# Patient Record
Sex: FEMALE | Race: WHITE | NOT HISPANIC OR LATINO
[De-identification: names, ages, dates, MRNs, and addresses within clinical notes are randomized per-mention and may not be internally consistent; named-entity substitution may affect disease eponyms.]

---

## 2023-01-17 PROBLEM — Z00.00 ENCOUNTER FOR PREVENTIVE HEALTH EXAMINATION: Status: ACTIVE | Noted: 2023-01-17

## 2023-01-18 ENCOUNTER — TRANSCRIPTION ENCOUNTER (OUTPATIENT)
Age: 28
End: 2023-01-18

## 2023-01-18 ENCOUNTER — INPATIENT (INPATIENT)
Facility: HOSPITAL | Age: 28
LOS: 1 days | Discharge: HOME | DRG: 481 | End: 2023-01-20
Attending: ORTHOPAEDIC SURGERY | Admitting: ORTHOPAEDIC SURGERY
Payer: COMMERCIAL

## 2023-01-18 ENCOUNTER — APPOINTMENT (OUTPATIENT)
Dept: ORTHOPEDIC SURGERY | Facility: CLINIC | Age: 28
End: 2023-01-18
Payer: COMMERCIAL

## 2023-01-18 VITALS — WEIGHT: 140 LBS | HEIGHT: 68 IN | BODY MASS INDEX: 21.22 KG/M2

## 2023-01-18 VITALS
TEMPERATURE: 98 F | OXYGEN SATURATION: 99 % | DIASTOLIC BLOOD PRESSURE: 78 MMHG | HEART RATE: 60 BPM | RESPIRATION RATE: 16 BRPM | SYSTOLIC BLOOD PRESSURE: 115 MMHG | WEIGHT: 139.99 LBS | HEIGHT: 68 IN

## 2023-01-18 DIAGNOSIS — S72.002A FRACTURE OF UNSPECIFIED PART OF NECK OF LEFT FEMUR, INITIAL ENCOUNTER FOR CLOSED FRACTURE: ICD-10-CM

## 2023-01-18 DIAGNOSIS — Z82.61 FAMILY HISTORY OF ARTHRITIS: ICD-10-CM

## 2023-01-18 DIAGNOSIS — M08.90 JUVENILE ARTHRITIS, UNSPECIFIED, UNSPECIFIED SITE: ICD-10-CM

## 2023-01-18 LAB
ALBUMIN SERPL ELPH-MCNC: 4.4 G/DL — SIGNIFICANT CHANGE UP (ref 3.3–5)
ALP SERPL-CCNC: 85 U/L — SIGNIFICANT CHANGE UP (ref 40–120)
ALT FLD-CCNC: 12 U/L — SIGNIFICANT CHANGE UP (ref 10–45)
ANION GAP SERPL CALC-SCNC: 10 MMOL/L — SIGNIFICANT CHANGE UP (ref 5–17)
APTT BLD: 33.9 SEC — SIGNIFICANT CHANGE UP (ref 27.5–35.5)
AST SERPL-CCNC: 22 U/L — SIGNIFICANT CHANGE UP (ref 10–40)
BILIRUB SERPL-MCNC: 0.5 MG/DL — SIGNIFICANT CHANGE UP (ref 0.2–1.2)
BLD GP AB SCN SERPL QL: NEGATIVE — SIGNIFICANT CHANGE UP
BUN SERPL-MCNC: 9 MG/DL — SIGNIFICANT CHANGE UP (ref 7–23)
CALCIUM SERPL-MCNC: 9.2 MG/DL — SIGNIFICANT CHANGE UP (ref 8.4–10.5)
CHLORIDE SERPL-SCNC: 103 MMOL/L — SIGNIFICANT CHANGE UP (ref 96–108)
CO2 SERPL-SCNC: 27 MMOL/L — SIGNIFICANT CHANGE UP (ref 22–31)
CREAT SERPL-MCNC: 0.57 MG/DL — SIGNIFICANT CHANGE UP (ref 0.5–1.3)
EGFR: 127 ML/MIN/1.73M2 — SIGNIFICANT CHANGE UP
GLUCOSE SERPL-MCNC: 104 MG/DL — HIGH (ref 70–99)
HCG SERPL-ACNC: <0 MIU/ML — SIGNIFICANT CHANGE UP
HCT VFR BLD CALC: 38.4 % — SIGNIFICANT CHANGE UP (ref 34.5–45)
HGB BLD-MCNC: 12.6 G/DL — SIGNIFICANT CHANGE UP (ref 11.5–15.5)
INR BLD: 1.02 — SIGNIFICANT CHANGE UP (ref 0.88–1.16)
MCHC RBC-ENTMCNC: 29 PG — SIGNIFICANT CHANGE UP (ref 27–34)
MCHC RBC-ENTMCNC: 32.8 GM/DL — SIGNIFICANT CHANGE UP (ref 32–36)
MCV RBC AUTO: 88.5 FL — SIGNIFICANT CHANGE UP (ref 80–100)
NRBC # BLD: 0 /100 WBCS — SIGNIFICANT CHANGE UP (ref 0–0)
PLATELET # BLD AUTO: 392 K/UL — SIGNIFICANT CHANGE UP (ref 150–400)
POTASSIUM SERPL-MCNC: 4.1 MMOL/L — SIGNIFICANT CHANGE UP (ref 3.5–5.3)
POTASSIUM SERPL-SCNC: 4.1 MMOL/L — SIGNIFICANT CHANGE UP (ref 3.5–5.3)
PROT SERPL-MCNC: 7.5 G/DL — SIGNIFICANT CHANGE UP (ref 6–8.3)
PROTHROM AB SERPL-ACNC: 12.2 SEC — SIGNIFICANT CHANGE UP (ref 10.5–13.4)
RBC # BLD: 4.34 M/UL — SIGNIFICANT CHANGE UP (ref 3.8–5.2)
RBC # FLD: 13.9 % — SIGNIFICANT CHANGE UP (ref 10.3–14.5)
RH IG SCN BLD-IMP: POSITIVE — SIGNIFICANT CHANGE UP
RH IG SCN BLD-IMP: POSITIVE — SIGNIFICANT CHANGE UP
SARS-COV-2 RNA SPEC QL NAA+PROBE: NEGATIVE — SIGNIFICANT CHANGE UP
SODIUM SERPL-SCNC: 140 MMOL/L — SIGNIFICANT CHANGE UP (ref 135–145)
WBC # BLD: 4.66 K/UL — SIGNIFICANT CHANGE UP (ref 3.8–10.5)
WBC # FLD AUTO: 4.66 K/UL — SIGNIFICANT CHANGE UP (ref 3.8–10.5)

## 2023-01-18 PROCEDURE — 99205 OFFICE O/P NEW HI 60 MIN: CPT | Mod: NC

## 2023-01-18 PROCEDURE — 99285 EMERGENCY DEPT VISIT HI MDM: CPT

## 2023-01-18 PROCEDURE — 73502 X-RAY EXAM HIP UNI 2-3 VIEWS: CPT | Mod: LT

## 2023-01-18 PROCEDURE — 71045 X-RAY EXAM CHEST 1 VIEW: CPT | Mod: 26

## 2023-01-18 PROCEDURE — 99222 1ST HOSP IP/OBS MODERATE 55: CPT | Mod: 25,57

## 2023-01-18 PROCEDURE — 99222 1ST HOSP IP/OBS MODERATE 55: CPT

## 2023-01-18 RX ORDER — POVIDONE-IODINE 5 %
1 AEROSOL (ML) TOPICAL ONCE
Refills: 0 | Status: COMPLETED | OUTPATIENT
Start: 2023-01-19 | End: 2023-01-19

## 2023-01-18 RX ORDER — ACETAMINOPHEN 500 MG
1000 TABLET ORAL EVERY 8 HOURS
Refills: 0 | Status: DISCONTINUED | OUTPATIENT
Start: 2023-01-18 | End: 2023-01-20

## 2023-01-18 RX ORDER — POLYETHYLENE GLYCOL 3350 17 G/17G
17 POWDER, FOR SOLUTION ORAL DAILY
Refills: 0 | Status: DISCONTINUED | OUTPATIENT
Start: 2023-01-18 | End: 2023-01-20

## 2023-01-18 RX ORDER — PANTOPRAZOLE SODIUM 20 MG/1
40 TABLET, DELAYED RELEASE ORAL
Refills: 0 | Status: DISCONTINUED | OUTPATIENT
Start: 2023-01-18 | End: 2023-01-20

## 2023-01-18 RX ORDER — ONDANSETRON 8 MG/1
4 TABLET, FILM COATED ORAL EVERY 8 HOURS
Refills: 0 | Status: DISCONTINUED | OUTPATIENT
Start: 2023-01-18 | End: 2023-01-20

## 2023-01-18 RX ORDER — OXYCODONE HYDROCHLORIDE 5 MG/1
5 TABLET ORAL EVERY 4 HOURS
Refills: 0 | Status: DISCONTINUED | OUTPATIENT
Start: 2023-01-18 | End: 2023-01-20

## 2023-01-18 RX ORDER — INFLUENZA VIRUS VACCINE 15; 15; 15; 15 UG/.5ML; UG/.5ML; UG/.5ML; UG/.5ML
0.5 SUSPENSION INTRAMUSCULAR ONCE
Refills: 0 | Status: DISCONTINUED | OUTPATIENT
Start: 2023-01-18 | End: 2023-01-20

## 2023-01-18 RX ORDER — SODIUM CHLORIDE 9 MG/ML
1000 INJECTION, SOLUTION INTRAVENOUS
Refills: 0 | Status: DISCONTINUED | OUTPATIENT
Start: 2023-01-18 | End: 2023-01-20

## 2023-01-18 RX ORDER — ACETAMINOPHEN 500 MG
1000 TABLET ORAL EVERY 6 HOURS
Refills: 0 | Status: DISCONTINUED | OUTPATIENT
Start: 2023-01-18 | End: 2023-01-18

## 2023-01-18 RX ORDER — CHLORHEXIDINE GLUCONATE 213 G/1000ML
1 SOLUTION TOPICAL ONCE
Refills: 0 | Status: COMPLETED | OUTPATIENT
Start: 2023-01-19 | End: 2023-01-19

## 2023-01-18 RX ORDER — OXYCODONE HYDROCHLORIDE 5 MG/1
10 TABLET ORAL EVERY 4 HOURS
Refills: 0 | Status: DISCONTINUED | OUTPATIENT
Start: 2023-01-18 | End: 2023-01-20

## 2023-01-18 NOTE — ED ADULT NURSE NOTE - CHIEF COMPLAINT QUOTE
Pt presents to the ED c/o left sided hip pain that began 1 week ago w/o injury. Pt states, "I am a  and I run so I am on my feet a lot" Pt sent to ED by Dr. Howard for possible admission for surgery. Denies injury or any other sx.

## 2023-01-18 NOTE — ED ADULT NURSE NOTE - OBJECTIVE STATEMENT
The patient is a 27y Female complaining of hip pain/injury. Pt reports L hip pain x 1 week, got Xray at urgent care and diagnosed with L hip stress fracture. Pt seen by surgeon today and sent to ED to get admitted for surgery. Pt arrives with crutches, states was told to not bear any weight on affected extremity. Pt denies CP, SOB, F/C, N/V/D.

## 2023-01-18 NOTE — H&P ADULT - PROBLEM SELECTOR PLAN 1
Admit to orthopedic service.  Preop for OR tomorrow for left hip DHS.   Medicine consulted for medical pre-optimization for OR.  Preop labs/EKG pending.  NPO/IVF at midnight for OR tomorrow.   Nutrition consult - per Dr. Howard has hx of disordered eating in the past.

## 2023-01-18 NOTE — ED PROVIDER NOTE - OBJECTIVE STATEMENT
28yo female with pmhx of juvenile arthritis (no longer on medication) presents with 1wk of L hip pain. Pt states she was walking one week ago when she felt a shooting pain in her left hip, denies fall or other trauma. Evaluated at outside  where she had an xray and was referred to a specialist, unable to have appt until today. She reports persistent pain in the left hip, worse with weight bearing. She is a runner but has not been running with this injury. Evaluated today in Dr. Howard where she had xray showing left hip fracture. She is being admitted for operative intervention.

## 2023-01-18 NOTE — ED PROVIDER NOTE - CLINICAL SUMMARY MEDICAL DECISION MAKING FREE TEXT BOX
Pt afebrile and hemodynamically stable. Found to have underlying left hip fracture on xray in Dr. Howard's office. To be admitted to Orthopedics for operative intervention. Ordered for preoperative labs. Admitted to Dr. Howard.

## 2023-01-18 NOTE — ED PROVIDER NOTE - PHYSICAL EXAMINATION
VITAL SIGNS: I have reviewed nursing notes and confirm.  CONSTITUTIONAL: Well-developed; in no acute distress.   SKIN:  warm and dry, no acute rash.   HEAD:  normocephalic, atraumatic.  EYES: PERRL, EOM intact; conjunctiva and sclera clear.  ENT: No nasal discharge; airway clear.   NECK: Supple; non tender.  CARD: S1, S2 normal; no murmurs, gallops, or rubs. Regular rate and rhythm.   RESP:  Clear to auscultation b/l, no wheezes, rales or rhonchi.  ABD: Normal bowel sounds; soft; non-distended; non-tender; no guarding/ rebound.  EXT: See orthopedic exam.  NEURO: Alert, oriented, grossly unremarkable  PSYCH: Cooperative, mood and affect appropriate.

## 2023-01-18 NOTE — ED PROVIDER NOTE - NS ED ATTENDING STATEMENT MOD
This was a shared visit with the LESTER. I reviewed and verified the documentation and independently performed the documented:

## 2023-01-18 NOTE — H&P ADULT - NSHPPHYSICALEXAM_GEN_ALL_CORE
Constitutional: vitals reviewed per nursing documentation, NAD, lying comfortably in bed/stretcher  Eyes: no obvious deformity, ecchymoses, swelling of eyelids  Neck: trachea midline  Lungs: not using accessory muscles of respiration, normal respiratory effort  Neuro/Psych: A&Ox3, normal affect and mood  MSK:   Skin warm and well perfused. Bilateral hips/knees non-tender to palpation. No open wounds/lesions to bilateral lower extremities. DP pulse palpable left lower extremity. Sensation intact and equal bilateral lower extremities. EHL/TA/GS/FHL 5/5 bilateral lower extremities. Range of motion left hip not tested 2/2 known left hip fracture.

## 2023-01-18 NOTE — CONSULT NOTE ADULT - SUBJECTIVE AND OBJECTIVE BOX
HPI:  This is a 26yo woman with a PMH of Juvenile arthritis (now resolved) and well-controlled anorexia nervosa who p/w 2 weeks of left hip, found to have a L-hip fracture.  She notes that 2 weeks ago she notes a pinch in her L-hip that progressed and peaked about 1 week ago, prompting her to go to Urgent Care.  There she was diagnosed with a hip fracture and referred to a surgeon, who reportedly could not perform the necessary procedure.  She then found Dr. Howard's name online and went to his office this AM, who then referred her to the ED this afternoon.  She denies any trauma to her L-hip.  She is an avid runner but has not been running more than usual.  She was on biologics as a child for her JA but never on prolonged steroids.  She has a history of anorexia for which she's been working with therapist and has been making marked improvements over the past year.  On ROS she denies weight changes, hair loss, heat intolerances or skin changes.  She does have noticeable hand dryness which she attributes to working as a  and outside temperatures.  As for her family history, there is a maternal grandmother with OA but no other known bone disease.  Remainder of her 12-point ROS otherwise negative.     PAST MEDICAL & SURGICAL HISTORY:  H/O juvenile arthritis    Home Medications: No meds nor OTC's.     Allergies  No Known Allergies    FAMILY HISTORY:  As per HPI.    Social History:  Works as a .  Uninsured.  Independent w/ADL's. Lives in a walk-up in the [place].   Denies toxic habits x3.     CURRENT MEDICATIONS:   acetaminophen     Tablet .. 1000 milliGRAM(s) Oral every 8 hours  lactated ringers. 1000 milliLiter(s) IV Continuous <Continuous>  ondansetron    Tablet 4 milliGRAM(s) Oral every 8 hours PRN  oxyCODONE    IR 10 milliGRAM(s) Oral every 4 hours PRN  oxyCODONE    IR 5 milliGRAM(s) Oral every 4 hours PRN  pantoprazole    Tablet 40 milliGRAM(s) Oral before breakfast  polyethylene glycol 3350 17 Gram(s) Oral daily      VITAL SIGNS, INS/OUTS (last 24 hours):  Vital Signs Last 24 Hrs  T(C): 36.9 (18 Jan 2023 11:03), Max: 36.9 (18 Jan 2023 11:03)  T(F): 98.4 (18 Jan 2023 11:03), Max: 98.4 (18 Jan 2023 11:03)  HR: 60 (18 Jan 2023 11:03) (60 - 60)  BP: 115/78 (18 Jan 2023 11:03) (115/78 - 115/78)  BP(mean): --  RR: 16 (18 Jan 2023 11:03) (16 - 16)  SpO2: 99% (18 Jan 2023 11:03) (99% - 99%)    Parameters below as of 18 Jan 2023 11:03  Patient On (Oxygen Delivery Method): room air    I&O's Summary    EXAM:  Gen: NAD, sitting upright in wheelchair  HEENT: NCAT, MMM, clear OP  Neck: supple, trachea at midline  CV: RRR, no m/g/r appreciated  Pulm: CTA B, no w/r/r; no increase in WOB  Abd: normoactive BS, soft, NTND  Ext: WWP, 2+ pulses x4; no c/c/e  Skin: bilateral hands w/skin cracking  Neuro: AOx3, CN II-XII grossly intact; nonfocal  Psych: pleasant, conversant and appropriate    BASIC LABS:                        12.6   4.66  )-----------( 392      ( 18 Jan 2023 11:32 )             38.4     01-18    140  |  103  |  9   ----------------------------<  104<H>  4.1   |  27  |  0.57    Ca    9.2      18 Jan 2023 11:32    TPro  7.5  /  Alb  4.4  /  TBili  0.5  /  DBili  x   /  AST  22  /  ALT  12  /  AlkPhos  85  01-18    PT/INR - ( 18 Jan 2023 11:32 )   PT: 12.2 sec;   INR: 1.02     PTT - ( 18 Jan 2023 11:32 )  PTT:33.9 sec    MICRODATA:  -- No new microdata.    IMAGING:  Unable to open PACS to assess if CXR done.     EKG: sinus rhythm w/R-axis deviation and ?iRBBB

## 2023-01-18 NOTE — CONSULT NOTE ADULT - ASSESSMENT
This is a 26yo woman with a PMH of Juvenile arthritis (now resolved) and well-controlled anorexia nervosa who p/w 2 weeks of left hip, found to have a L-hip fracture, now waiting for OR on 1/19.    #Pre-op exam  -- Risk of planned surgical procedure is intermediate.  -- Patient does not have high risk cardiac conditions (i.e. unstable angina, decompensated CHF, significant arrhythmia, significant valvular disease)   -- RCRI score 0, Pimentel score 0   -- Patient meet METs >4 blocks and >2 flights of stairs.  -- Patient is low cardiac risk for intermediate risk procedure. Can proceed w/surgery if accepting of these risks.  -- Would repeat EKG to assess for stability.   -- Would obtain non-urgent BRAULIO if repeat EKG with iRBBB; however, would not hold up surgery for it.      #Anorexia nervosa  -- can check vitamin D, TSH and PTH  -- will need DEXA scan as an outpatient     #Diet - NPO  #DVT PPx - defer to primary team  #Dispo - TBD    Renetta Galeano  Attending Hospitalist  [phone]

## 2023-01-18 NOTE — PATIENT PROFILE ADULT - NSTRANSFERBELONGINGSRESP_GEN_A_NUR
Coronary artery disease is a condition where the arteries the supply the heart muscle get clogges with fatty deposits & puts you at risk for a heart attack  Call your doctor if you have any new pain, pressure, or discomfort in the center of your chest, pain, tingling or discomfort in arms, back, neck, jaw, or stomach, shortness of breath, nausea, vomiting, burping or heartburn, sweating, cold and clammy skin, racing or abnormal heartbeat for more than 10 minutes or if they keep coming & going.  Call 911 and do not tr to get to hospital by care  You can help yourself with lefestyle changes (quitting smoking if you smoke), eat lots of fruits & vegetables & low fat dairy products, not a lot of meat & fatty foods, walk or some form of physical activity most days of the week, lose weight if you are overweight  Take your cardiac medication as prescribed to lower cholesterol, to lower blood pressure, aspirin to prevent blood clots, and diabetes control  Make sure to keep appointments with doctor for cardiac follow up care Low salt diet  Activity as tolerated.  Take all medication as prescribed.  Follow up with your medical doctor for routine blood pressure monitoring at your next visit.  Notify your doctor if you have any of the following symptoms:   Dizziness, Lightheadedness, Blurry vision, Headache, Chest pain, Shortness of breath yes resolved Fainting usually is caused by fear, stress, pain, standing too long, over tired, overheated, going to bathroom, or coughing  Blood pressure can drop if you do not drink enough, blood pressure medication, alcohol, bleeding,  Consult with your doctor about driving  Avoid activity or condition that causes your syncope  Lay down with your feet up when you feel like you might faint Mechanical break done of PCM lead with Pacemaker and lead extraction and   DDD PCM implantation  your pacemaker can send an electrical signal to your heart when it is necessary  call your doctor if you feel dizzy, lightheaded, shortness of breath, confused, more tired, faint  you will follow up with your pacemaker doctor regularly to check your pacemaker  your pacemaker battery usually will last 5 - 8 years  avoid certain electric or magnetic equipment  if you cannot walk through metal detector, ask for hand security search  most of the time you will not be able to have MRI  follow directions  that you received about arm use and mobility, driving, sex & sling use  you make want to get a emergency medical bracelet telling people you have a pacemaker  tell any health care provider that you have a pacemaker PPM care 1. No shower for the next week  2. no heavy lifting at all   3. keep dressing over PPM site for 3 more days or until wound heals   4. apply bacitracin ointment to pacemaker site twice a day and cover with dry sterile dressing    5. no lifting with left arm   6. do not raise left arm over head or reaching for any objects

## 2023-01-18 NOTE — PATIENT PROFILE ADULT - FALL HARM RISK - HARM RISK INTERVENTIONS
Assistance with ambulation/Assistance OOB with selected safe patient handling equipment/Communicate Risk of Fall with Harm to all staff/Discuss with provider need for PT consult/Monitor gait and stability/Reinforce activity limits and safety measures with patient and family/Tailored Fall Risk Interventions/Visual Cue: Yellow wristband and red socks/Bed in lowest position, wheels locked, appropriate side rails in place/Call bell, personal items and telephone in reach/Instruct patient to call for assistance before getting out of bed or chair/Non-slip footwear when patient is out of bed/Agenda to call system/Physically safe environment - no spills, clutter or unnecessary equipment/Purposeful Proactive Rounding/Room/bathroom lighting operational, light cord in reach

## 2023-01-18 NOTE — H&P ADULT - NS ATTEND AMEND GEN_ALL_CORE FT
Patient was referred to the hospital from my office today after identifcationof displaced femoral neck fracture, given age and nature of injury extremely high risk and urgent, especially in the setting of delayed evaluation and treatment after initial urgent care visit.    Woill proceed to medical clearance and surgery.  Strict bed rest.  Neutrition eval and counseling.  pt agrees.

## 2023-01-18 NOTE — ED ADULT NURSE NOTE - NSSUHOSCREENINGYN_ED_ALL_ED
Refill approved as requested.  
Refill request for Effexor XR 75 mg cap  Last refill 5/17/21to [facility]    Last visit was 4/26/21  Future appointment None    Spoke with patient, she does not need a refill today. Patient switched pharmacies. Patient asked if we can sent a prescription to be filled when its due. Rx is post dated until 6/16/21  Rx ready to sign if appropriate to refill.           
See refill order    
Yes - the patient is able to be screened

## 2023-01-18 NOTE — H&P ADULT - HISTORY OF PRESENT ILLNESS
The patient is a 27 year old female with no significant past medical history presenting with left hip pain. The patient states she works as a  and is on her feet often for work and is also a runner. She states that 2 weeks ago she began having mild pain in left groin without preceding accident, injury or trauma. She states that after she began having this pain she tried to rest more and stopped running but that 1 week ago she developed a sharp pain in her left hip to the point she was having trouble walking. She went to an urgent care center and was told that she has a left hip fracture and was referred to a specialist. The patient saw Dr. Howard in his outpatient office today and was sent to the ER for further management. Denies numbness/tingling or pain in any other joints.

## 2023-01-19 ENCOUNTER — TRANSCRIPTION ENCOUNTER (OUTPATIENT)
Age: 28
End: 2023-01-19

## 2023-01-19 PROCEDURE — 27236 TREAT THIGH FRACTURE: CPT | Mod: LT

## 2023-01-19 PROCEDURE — 93306 TTE W/DOPPLER COMPLETE: CPT | Mod: 26

## 2023-01-19 PROCEDURE — 99232 SBSQ HOSP IP/OBS MODERATE 35: CPT

## 2023-01-19 DEVICE — IMPLANTABLE DEVICE: Type: IMPLANTABLE DEVICE | Status: FUNCTIONAL

## 2023-01-19 DEVICE — SCREW P/T ASNS III 6.5X85MM: Type: IMPLANTABLE DEVICE | Status: FUNCTIONAL

## 2023-01-19 DEVICE — GUIDEWIRE OMEGA GUIDE PIN COCR THREADED TIP 2.8MM X 230MM: Type: IMPLANTABLE DEVICE | Status: FUNCTIONAL

## 2023-01-19 DEVICE — GWIRE ASNIS III THREADED 3.2X300MM: Type: IMPLANTABLE DEVICE | Status: FUNCTIONAL

## 2023-01-19 RX ORDER — ONDANSETRON 8 MG/1
4 TABLET, FILM COATED ORAL EVERY 6 HOURS
Refills: 0 | Status: DISCONTINUED | OUTPATIENT
Start: 2023-01-19 | End: 2023-01-20

## 2023-01-19 RX ORDER — POLYETHYLENE GLYCOL 3350 17 G/17G
17 POWDER, FOR SOLUTION ORAL AT BEDTIME
Refills: 0 | Status: DISCONTINUED | OUTPATIENT
Start: 2023-01-19 | End: 2023-01-20

## 2023-01-19 RX ORDER — HYDROMORPHONE HYDROCHLORIDE 2 MG/ML
1 INJECTION INTRAMUSCULAR; INTRAVENOUS; SUBCUTANEOUS
Refills: 0 | Status: DISCONTINUED | OUTPATIENT
Start: 2023-01-19 | End: 2023-01-19

## 2023-01-19 RX ORDER — CYCLOBENZAPRINE HYDROCHLORIDE 10 MG/1
5 TABLET, FILM COATED ORAL THREE TIMES A DAY
Refills: 0 | Status: DISCONTINUED | OUTPATIENT
Start: 2023-01-19 | End: 2023-01-20

## 2023-01-19 RX ORDER — OXYCODONE HYDROCHLORIDE 5 MG/1
10 TABLET ORAL EVERY 4 HOURS
Refills: 0 | Status: DISCONTINUED | OUTPATIENT
Start: 2023-01-19 | End: 2023-01-20

## 2023-01-19 RX ORDER — PANTOPRAZOLE SODIUM 20 MG/1
40 TABLET, DELAYED RELEASE ORAL
Refills: 0 | Status: DISCONTINUED | OUTPATIENT
Start: 2023-01-19 | End: 2023-01-20

## 2023-01-19 RX ORDER — CEFAZOLIN SODIUM 1 G
2000 VIAL (EA) INJECTION EVERY 8 HOURS
Refills: 0 | Status: COMPLETED | OUTPATIENT
Start: 2023-01-19 | End: 2023-01-20

## 2023-01-19 RX ORDER — HYDROMORPHONE HYDROCHLORIDE 2 MG/ML
0.5 INJECTION INTRAMUSCULAR; INTRAVENOUS; SUBCUTANEOUS
Refills: 0 | Status: DISCONTINUED | OUTPATIENT
Start: 2023-01-19 | End: 2023-01-20

## 2023-01-19 RX ORDER — SODIUM CHLORIDE 9 MG/ML
1000 INJECTION, SOLUTION INTRAVENOUS
Refills: 0 | Status: DISCONTINUED | OUTPATIENT
Start: 2023-01-19 | End: 2023-01-20

## 2023-01-19 RX ORDER — MAGNESIUM HYDROXIDE 400 MG/1
30 TABLET, CHEWABLE ORAL DAILY
Refills: 0 | Status: DISCONTINUED | OUTPATIENT
Start: 2023-01-19 | End: 2023-01-20

## 2023-01-19 RX ORDER — SENNA PLUS 8.6 MG/1
2 TABLET ORAL AT BEDTIME
Refills: 0 | Status: DISCONTINUED | OUTPATIENT
Start: 2023-01-19 | End: 2023-01-20

## 2023-01-19 RX ORDER — ACETAMINOPHEN 500 MG
650 TABLET ORAL EVERY 6 HOURS
Refills: 0 | Status: DISCONTINUED | OUTPATIENT
Start: 2023-01-19 | End: 2023-01-20

## 2023-01-19 RX ORDER — ASPIRIN/CALCIUM CARB/MAGNESIUM 324 MG
81 TABLET ORAL
Refills: 0 | Status: DISCONTINUED | OUTPATIENT
Start: 2023-01-20 | End: 2023-01-20

## 2023-01-19 RX ORDER — OXYCODONE HYDROCHLORIDE 5 MG/1
5 TABLET ORAL EVERY 4 HOURS
Refills: 0 | Status: DISCONTINUED | OUTPATIENT
Start: 2023-01-19 | End: 2023-01-20

## 2023-01-19 RX ADMIN — HYDROMORPHONE HYDROCHLORIDE 1 MILLIGRAM(S): 2 INJECTION INTRAMUSCULAR; INTRAVENOUS; SUBCUTANEOUS at 19:24

## 2023-01-19 RX ADMIN — Medication 1 APPLICATION(S): at 13:22

## 2023-01-19 RX ADMIN — Medication 650 MILLIGRAM(S): at 23:50

## 2023-01-19 RX ADMIN — Medication 100 MILLIGRAM(S): at 23:33

## 2023-01-19 RX ADMIN — Medication 650 MILLIGRAM(S): at 22:50

## 2023-01-19 RX ADMIN — CHLORHEXIDINE GLUCONATE 1 APPLICATION(S): 213 SOLUTION TOPICAL at 05:18

## 2023-01-19 RX ADMIN — SODIUM CHLORIDE 65 MILLILITER(S): 9 INJECTION, SOLUTION INTRAVENOUS at 00:09

## 2023-01-19 RX ADMIN — POLYETHYLENE GLYCOL 3350 17 GRAM(S): 17 POWDER, FOR SOLUTION ORAL at 22:50

## 2023-01-19 RX ADMIN — CYCLOBENZAPRINE HYDROCHLORIDE 5 MILLIGRAM(S): 10 TABLET, FILM COATED ORAL at 23:51

## 2023-01-19 RX ADMIN — SENNA PLUS 2 TABLET(S): 8.6 TABLET ORAL at 22:49

## 2023-01-19 NOTE — PROGRESS NOTE ADULT - ASSESSMENT
This is a 28yo woman with a PMH of Juvenile arthritis (now resolved) and well-controlled anorexia nervosa who p/w 2 weeks of left hip, found to have a L-hip fracture, now waiting for OR on 1/19.    #Pre-op exam  -- Risk of planned surgical procedure is intermediate.  -- Patient does not have high risk cardiac conditions (i.e. unstable angina, decompensated CHF, significant arrhythmia, significant valvular disease)   -- RCRI score 0, Pimentel score 0   -- Patient meet METs >4 blocks and >2 flights of stairs.  -- Patient is low cardiac risk for intermediate risk procedure. Can proceed w/surgery if accepting of these risks.  -- Would obtain non-urgent BRAULIO if repeat EKG with iRBBB; however, would not hold up surgery for it.      #Anorexia nervosa  -- can check vitamin D, TSH and PTH  -- will need DEXA scan as an outpatient     #Diet - NPO  #DVT PPx - defer to primary team  #Dispo - TBD; needs to speak to SW about payment plans as uninsured     Renetta Galeano  Attending Hospitalist  [phone]

## 2023-01-19 NOTE — DIETITIAN INITIAL EVALUATION ADULT - PERTINENT LABORATORY DATA
01-18    140  |  103  |  9   ----------------------------<  104<H>  4.1   |  27  |  0.57    Ca    9.2      18 Jan 2023 11:32    TPro  7.5  /  Alb  4.4  /  TBili  0.5  /  DBili  x   /  AST  22  /  ALT  12  /  AlkPhos  85  01-18

## 2023-01-19 NOTE — DIETITIAN INITIAL EVALUATION ADULT - NS FNS DIET ORDER
Diet, NPO after Midnight:      NPO Start Date: 18-Jan-2023,   NPO Start Time: 23:59 (01-18-23 @ 12:50)

## 2023-01-19 NOTE — BRIEF OPERATIVE NOTE - NSICDXBRIEFPROCEDURE_GEN_ALL_CORE_FT
PROCEDURES:  Open reduction and internal fixation of fracture of left femoral neck with dynamic hip screw 19-Jan-2023 16:49:21  Ethan Tavarez

## 2023-01-19 NOTE — DIETITIAN INITIAL EVALUATION ADULT - PERTINENT MEDS FT
MEDICATIONS  (STANDING):  acetaminophen     Tablet .. 1000 milliGRAM(s) Oral every 8 hours  influenza   Vaccine 0.5 milliLiter(s) IntraMuscular once  lactated ringers. 1000 milliLiter(s) (65 mL/Hr) IV Continuous <Continuous>  pantoprazole    Tablet 40 milliGRAM(s) Oral before breakfast  polyethylene glycol 3350 17 Gram(s) Oral daily  povidone iodine 5% Nasal Swab 1 Application(s) Both Nostrils once    MEDICATIONS  (PRN):  ondansetron    Tablet 4 milliGRAM(s) Oral every 8 hours PRN Nausea and/or Vomiting  oxyCODONE    IR 10 milliGRAM(s) Oral every 4 hours PRN Severe Pain (7 - 10)  oxyCODONE    IR 5 milliGRAM(s) Oral every 4 hours PRN Moderate Pain (4 - 6)

## 2023-01-19 NOTE — DIETITIAN INITIAL EVALUATION ADULT - ADD RECOMMEND
1. Advance diet to regular when medically able  2. Monitor %PO intake and tolerance  3. Pain and bowel regimens per team  4. RD to remain available prn

## 2023-01-19 NOTE — DIETITIAN INITIAL EVALUATION ADULT - OTHER INFO
27 year old female with no significant past medical history presenting with left hip pain. The patient states she works as a  and is on her feet often for work and is also a runner. She states that 2 weeks ago she began having mild pain in left groin without preceding accident, injury or trauma. She states that after she began having this pain she tried to rest more and stopped running but that 1 week ago she developed a sharp pain in her left hip to the point she was having trouble walking. She went to an urgent care center and was told that she has a left hip fracture and was referred to a specialist. The patient saw Dr. Howard in his outpatient office today and was sent to the ER for further management.    Pt seen in room for nutrition assessment. Currently NPO for OR today. Pt reports good/fair appetite at baseline, currently decreased d/t being in the hospital. Per diet recall: has small meal in the morning, tastes food items at work during lunch (works as a ), has larger meal after work around 8pm. Likes fruits, vegetables, lean protein. NKFA. No cultural, ethnic, Religion food preferences noted. Pt reports stability at current wt ~140lbs. NFPE unremarkable. Pt reports history of disordered eating, has seen therapist and nutritionist in the past and reports "doing a lot better" therefore discouraged about being in the hospital. RD provided emotional support as well education on importance of adequate PO intake. Discussed importance of protein for healing/recovery and calcium/vitamin D for bone health. Pt expressed understanding. Left RD contact information. Pt denies N/V/D/C at present. Denies pain. Please see full nutrition recommendations below. Will continue to follow per RD protocol.

## 2023-01-20 ENCOUNTER — TRANSCRIPTION ENCOUNTER (OUTPATIENT)
Age: 28
End: 2023-01-20

## 2023-01-20 VITALS
OXYGEN SATURATION: 100 % | RESPIRATION RATE: 16 BRPM | SYSTOLIC BLOOD PRESSURE: 97 MMHG | DIASTOLIC BLOOD PRESSURE: 58 MMHG | HEART RATE: 49 BPM | TEMPERATURE: 98 F

## 2023-01-20 LAB
24R-OH-CALCIDIOL SERPL-MCNC: 11 NG/ML — LOW (ref 30–80)
ANION GAP SERPL CALC-SCNC: 14 MMOL/L — SIGNIFICANT CHANGE UP (ref 5–17)
BUN SERPL-MCNC: 11 MG/DL — SIGNIFICANT CHANGE UP (ref 7–23)
CALCIUM SERPL-MCNC: 8.7 MG/DL — SIGNIFICANT CHANGE UP (ref 8.4–10.5)
CALCIUM SERPL-MCNC: 9.5 MG/DL — SIGNIFICANT CHANGE UP (ref 8.4–10.5)
CHLORIDE SERPL-SCNC: 96 MMOL/L — SIGNIFICANT CHANGE UP (ref 96–108)
CO2 SERPL-SCNC: 25 MMOL/L — SIGNIFICANT CHANGE UP (ref 22–31)
CREAT SERPL-MCNC: 0.57 MG/DL — SIGNIFICANT CHANGE UP (ref 0.5–1.3)
EGFR: 127 ML/MIN/1.73M2 — SIGNIFICANT CHANGE UP
GLUCOSE SERPL-MCNC: 102 MG/DL — HIGH (ref 70–99)
HCT VFR BLD CALC: 36.4 % — SIGNIFICANT CHANGE UP (ref 34.5–45)
HGB BLD-MCNC: 11.6 G/DL — SIGNIFICANT CHANGE UP (ref 11.5–15.5)
MAGNESIUM SERPL-MCNC: 1.7 MG/DL — SIGNIFICANT CHANGE UP (ref 1.6–2.6)
MCHC RBC-ENTMCNC: 28.6 PG — SIGNIFICANT CHANGE UP (ref 27–34)
MCHC RBC-ENTMCNC: 31.9 GM/DL — LOW (ref 32–36)
MCV RBC AUTO: 89.9 FL — SIGNIFICANT CHANGE UP (ref 80–100)
NRBC # BLD: 0 /100 WBCS — SIGNIFICANT CHANGE UP (ref 0–0)
PHOSPHATE SERPL-MCNC: 5.1 MG/DL — HIGH (ref 2.5–4.5)
PLATELET # BLD AUTO: 324 K/UL — SIGNIFICANT CHANGE UP (ref 150–400)
POTASSIUM SERPL-MCNC: 4.2 MMOL/L — SIGNIFICANT CHANGE UP (ref 3.5–5.3)
POTASSIUM SERPL-SCNC: 4.2 MMOL/L — SIGNIFICANT CHANGE UP (ref 3.5–5.3)
PREALB SERPL-MCNC: 18 MG/DL — LOW (ref 20–40)
PTH-INTACT FLD-MCNC: 25 PG/ML — SIGNIFICANT CHANGE UP (ref 15–65)
RBC # BLD: 4.05 M/UL — SIGNIFICANT CHANGE UP (ref 3.8–5.2)
RBC # FLD: 13.5 % — SIGNIFICANT CHANGE UP (ref 10.3–14.5)
SODIUM SERPL-SCNC: 135 MMOL/L — SIGNIFICANT CHANGE UP (ref 135–145)
TSH SERPL-MCNC: 0.91 UIU/ML — SIGNIFICANT CHANGE UP (ref 0.27–4.2)
VIT D25+D1,25 OH+D1,25 PNL SERPL-MCNC: 26.3 PG/ML — SIGNIFICANT CHANGE UP (ref 19.9–79.3)
WBC # BLD: 8.9 K/UL — SIGNIFICANT CHANGE UP (ref 3.8–10.5)
WBC # FLD AUTO: 8.9 K/UL — SIGNIFICANT CHANGE UP (ref 3.8–10.5)

## 2023-01-20 PROCEDURE — 84443 ASSAY THYROID STIM HORMONE: CPT

## 2023-01-20 PROCEDURE — 84100 ASSAY OF PHOSPHORUS: CPT

## 2023-01-20 PROCEDURE — 86900 BLOOD TYPING SEROLOGIC ABO: CPT

## 2023-01-20 PROCEDURE — 72170 X-RAY EXAM OF PELVIS: CPT | Mod: 26

## 2023-01-20 PROCEDURE — 85730 THROMBOPLASTIN TIME PARTIAL: CPT

## 2023-01-20 PROCEDURE — 71045 X-RAY EXAM CHEST 1 VIEW: CPT

## 2023-01-20 PROCEDURE — 80048 BASIC METABOLIC PNL TOTAL CA: CPT

## 2023-01-20 PROCEDURE — 86850 RBC ANTIBODY SCREEN: CPT

## 2023-01-20 PROCEDURE — 87635 SARS-COV-2 COVID-19 AMP PRB: CPT

## 2023-01-20 PROCEDURE — 97161 PT EVAL LOW COMPLEX 20 MIN: CPT

## 2023-01-20 PROCEDURE — 93306 TTE W/DOPPLER COMPLETE: CPT

## 2023-01-20 PROCEDURE — 83735 ASSAY OF MAGNESIUM: CPT

## 2023-01-20 PROCEDURE — 93005 ELECTROCARDIOGRAM TRACING: CPT

## 2023-01-20 PROCEDURE — 82652 VIT D 1 25-DIHYDROXY: CPT

## 2023-01-20 PROCEDURE — 85027 COMPLETE CBC AUTOMATED: CPT

## 2023-01-20 PROCEDURE — 80053 COMPREHEN METABOLIC PANEL: CPT

## 2023-01-20 PROCEDURE — 72170 X-RAY EXAM OF PELVIS: CPT

## 2023-01-20 PROCEDURE — 99232 SBSQ HOSP IP/OBS MODERATE 35: CPT

## 2023-01-20 PROCEDURE — 84702 CHORIONIC GONADOTROPIN TEST: CPT

## 2023-01-20 PROCEDURE — C1769: CPT

## 2023-01-20 PROCEDURE — 99285 EMERGENCY DEPT VISIT HI MDM: CPT

## 2023-01-20 PROCEDURE — 84134 ASSAY OF PREALBUMIN: CPT

## 2023-01-20 PROCEDURE — 86901 BLOOD TYPING SEROLOGIC RH(D): CPT

## 2023-01-20 PROCEDURE — 82306 VITAMIN D 25 HYDROXY: CPT

## 2023-01-20 PROCEDURE — C1713: CPT

## 2023-01-20 PROCEDURE — 76000 FLUOROSCOPY <1 HR PHYS/QHP: CPT

## 2023-01-20 PROCEDURE — 36415 COLL VENOUS BLD VENIPUNCTURE: CPT

## 2023-01-20 PROCEDURE — 85610 PROTHROMBIN TIME: CPT

## 2023-01-20 PROCEDURE — 82310 ASSAY OF CALCIUM: CPT

## 2023-01-20 PROCEDURE — 83970 ASSAY OF PARATHORMONE: CPT

## 2023-01-20 RX ORDER — PANTOPRAZOLE SODIUM 20 MG/1
1 TABLET, DELAYED RELEASE ORAL
Qty: 30 | Refills: 0
Start: 2023-01-20 | End: 2023-02-18

## 2023-01-20 RX ORDER — SODIUM CHLORIDE 9 MG/ML
500 INJECTION INTRAMUSCULAR; INTRAVENOUS; SUBCUTANEOUS ONCE
Refills: 0 | Status: COMPLETED | OUTPATIENT
Start: 2023-01-20 | End: 2023-01-20

## 2023-01-20 RX ORDER — OXYCODONE HYDROCHLORIDE 5 MG/1
1 TABLET ORAL
Qty: 42 | Refills: 0
Start: 2023-01-20 | End: 2023-01-26

## 2023-01-20 RX ORDER — SENNA PLUS 8.6 MG/1
2 TABLET ORAL
Qty: 0 | Refills: 0 | DISCHARGE
Start: 2023-01-20

## 2023-01-20 RX ORDER — ERGOCALCIFEROL 1.25 MG/1
50000 CAPSULE ORAL ONCE
Refills: 0 | Status: COMPLETED | OUTPATIENT
Start: 2023-01-20 | End: 2023-01-20

## 2023-01-20 RX ORDER — CYCLOBENZAPRINE HYDROCHLORIDE 10 MG/1
1 TABLET, FILM COATED ORAL
Qty: 42 | Refills: 0
Start: 2023-01-20 | End: 2023-02-02

## 2023-01-20 RX ORDER — ACETAMINOPHEN 500 MG
2 TABLET ORAL
Qty: 0 | Refills: 0 | DISCHARGE
Start: 2023-01-20

## 2023-01-20 RX ORDER — ERGOCALCIFEROL 1.25 MG/1
1 CAPSULE ORAL
Qty: 0 | Refills: 0 | DISCHARGE
Start: 2023-01-20

## 2023-01-20 RX ORDER — ASPIRIN/CALCIUM CARB/MAGNESIUM 324 MG
1 TABLET ORAL
Qty: 60 | Refills: 0
Start: 2023-01-20 | End: 2023-02-18

## 2023-01-20 RX ADMIN — Medication 650 MILLIGRAM(S): at 06:51

## 2023-01-20 RX ADMIN — Medication 650 MILLIGRAM(S): at 05:51

## 2023-01-20 RX ADMIN — Medication 650 MILLIGRAM(S): at 12:44

## 2023-01-20 RX ADMIN — Medication 1 TABLET(S): at 11:45

## 2023-01-20 RX ADMIN — SODIUM CHLORIDE 500 MILLILITER(S): 9 INJECTION INTRAMUSCULAR; INTRAVENOUS; SUBCUTANEOUS at 14:42

## 2023-01-20 RX ADMIN — ERGOCALCIFEROL 50000 UNIT(S): 1.25 CAPSULE ORAL at 15:17

## 2023-01-20 RX ADMIN — CYCLOBENZAPRINE HYDROCHLORIDE 5 MILLIGRAM(S): 10 TABLET, FILM COATED ORAL at 12:05

## 2023-01-20 RX ADMIN — Medication 100 MILLIGRAM(S): at 07:03

## 2023-01-20 RX ADMIN — Medication 650 MILLIGRAM(S): at 11:44

## 2023-01-20 NOTE — PROGRESS NOTE ADULT - ATTENDING COMMENTS
Patient doing well postop; eval by PT, niutrition counseling completed.  Followup in 2 wks outpt once clears PT and discharged.

## 2023-01-20 NOTE — PHYSICAL THERAPY INITIAL EVALUATION ADULT - GENERAL OBSERVATIONS, REHAB EVAL
Patient received semi-sierra in bed  in NAD on RA, +SCDs, +Heplock. Cleared by RACHAEL Sanchez. Agreeable to PT.

## 2023-01-20 NOTE — DISCHARGE NOTE PROVIDER - NSDCFUADDINST_GEN_ALL_CORE_FT
ACTIVITY:  - Weightbear as tolerated with assistive device. No strenuous activity, heavy lifting, driving or returning to work until cleared by MD.  - You may experience postoperative swelling on the operative extremity. You may ice the surgery site for 20 minute intervals.    DRESSING: Aquacel   - You may shower, your dressing is water-resistant. Do not soak in bathtubs. Remove dressing after postop day 7, then leave incision open to air.  - Keep your incision clean and dry. Do not pick at your incision. Do not apply creams, ointments or oils to your incision until cleared by your surgeon. Do not soak your incision in sitting water (ie tubs, pools, lakes, etc.) until cleared by your surgeon. You may let clean, running water fall over your incision.    MEDICATION/ANTICOAGULATION:  -You have been prescribed aspirin, as a preventative to help prevent postoperative blood clots. Please take this medication as prescribed.   - You have been prescribed medications for pain:    - Tylenol for mild to moderate pain. Do not exceed 3,000mg daily.    - For more severe pain, take Tylenol with the addition of narcotic pain medication. Take this medication as prescribed. This medication may cause drowsiness or dizziness. Do not operate machinery. This medication may cause constipation.  - For any additional medications, follow instructions on the bottle.   -Try to have regular bowel movements. Take stool softener or laxative if necessary. You may wish to take Miralax daily until you have regular bowel movements.   - If you have been prescribed Aspirin or an anti-inflammatory, please take Prilosec once a day, before breakfast, until no longer taking Aspirin or anti-inflammatory. This will help protect your stomach.  - If you have a pain management physician, please follow-up with them postoperatively.   - If you experience any negative side effects of your medications, please call your surgeon's office to discuss.    Follow-up:  - Call to schedule an appt with Dr. Howard for follow up. If you have staples or sutures they will be removed in office.  - Please follow-up with your primary care physician or any other specialist you see postoperatively, if needed.   - Contact your doctor if you experience: fever greater than 101.5, chills, chest pain, difficulty breathing, redness or excessive drainage around the incision, other concerns.  ACTIVITY:  - Weightbear as tolerated with assistive device. No strenuous activity, heavy lifting, driving or returning to work until cleared by MD.  - You may experience postoperative swelling on the operative extremity. You may ice the surgery site for 20 minute intervals.    DRESSING: Aquacel   - You may shower, your dressing is water-resistant. Do not soak in bathtubs. Remove dressing after postop day 7, then leave incision open to air.  - Keep your incision clean and dry. Do not pick at your incision. Do not apply creams, ointments or oils to your incision until cleared by your surgeon. Do not soak your incision in sitting water (ie tubs, pools, lakes, etc.) until cleared by your surgeon. You may let clean, running water fall over your incision.    MEDICATION/ANTICOAGULATION:  -You have been prescribed aspirin, as a preventative to help prevent postoperative blood clots. Please take this medication as prescribed.   - You have been prescribed medications for pain:    - Tylenol for mild to moderate pain. Do not exceed 3,000mg daily.    - For more severe pain, take Tylenol with the addition of narcotic pain medication. Take this medication as prescribed. This medication may cause drowsiness or dizziness. Do not operate machinery. This medication may cause constipation.  - For any additional medications, follow instructions on the bottle.   -Try to have regular bowel movements. Take stool softener or laxative if necessary. You may wish to take Miralax daily until you have regular bowel movements.   - If you have been prescribed Aspirin or an anti-inflammatory, please take Prilosec once a day, before breakfast, until no longer taking Aspirin or anti-inflammatory. This will help protect your stomach.  - If you have a pain management physician, please follow-up with them postoperatively.   - If you experience any negative side effects of your medications, please call your surgeon's office to discuss.    Vitamin D Deficiency- -- please start vitamin D3 50,000 IU PO weekly x 6wks and refer to Endocrine/Bone Specialist   -- will need DEXA scan as an outpatient     Follow-up:  - Call to schedule an appt with Dr. Howard for follow up. If you have staples or sutures they will be removed in office.  - Please follow-up with your primary care physician or any other specialist you see postoperatively, if needed.   - Contact your doctor if you experience: fever greater than 101.5, chills, chest pain, difficulty breathing, redness or excessive drainage around the incision, other concerns.

## 2023-01-20 NOTE — DISCHARGE NOTE PROVIDER - HOSPITAL COURSE
Admitted  Surgery left hip DHS  Daria-op Antibiotics  Pain control  DVT prophylaxis  OOB/Physical Therapy Admitted  Surgery left hip DHS  Daria-op Antibiotics  Pain control  DVT prophylaxis- asa 81 bid   OOB/Physical Therapy   Vitamin D Deficiency -- please start vitamin D3 50,000 IU PO weekly x 6wks and refer to Endocrine/Bone Specialist   -- will need DEXA scan as an outpatient

## 2023-01-20 NOTE — PHYSICAL THERAPY INITIAL EVALUATION ADULT - PERTINENT HX OF CURRENT PROBLEM, REHAB EVAL
26yo woman with a PMH of Juvenile arthritis (now resolved) and well-controlled anorexia nervosa who p/w 2 weeks of left hip, found to have a L-femoral neck fracture, now s/p ORIF on 1/19.

## 2023-01-20 NOTE — PROGRESS NOTE ADULT - SUBJECTIVE AND OBJECTIVE BOX
Diagnosis: left femoral neck fracture              SUBJECTIVE: Patient seen and examined. Pt. states she is feeling good and barely using any pain medications.   Denies any sob/cp/n/v/numbness or tingling in b/l     OBJECTIVE:     Vital Signs Last 24 Hrs  T(C): 36.3 (19 Jan 2023 08:12), Max: 36.8 (18 Jan 2023 15:00)  T(F): 97.4 (19 Jan 2023 08:12), Max: 98.3 (18 Jan 2023 18:37)  HR: 34 (19 Jan 2023 08:12) (34 - 52)  BP: 108/65 (19 Jan 2023 08:12) (101/64 - 136/73)  BP(mean): --  RR: 18 (19 Jan 2023 08:12) (16 - 18)  SpO2: 99% (19 Jan 2023 08:12) (98% - 100%)    Parameters below as of 19 Jan 2023 08:12  Patient On (Oxygen Delivery Method): room air        General: NAD   Affected extremity: Left LE skin intact, no erythema/ecchymosis/sts. No ttp at left hip region. No bony tenderness in left le.   Sensation: intact to light touch to patient's baseline  Motor exam: EHL/TA/GS  5/5  Pulses              I&O's Detail    18 Jan 2023 07:01  -  19 Jan 2023 07:00  --------------------------------------------------------  IN:  Total IN: 0 mL    OUT:    Voided (mL): 300 mL  Total OUT: 300 mL    Total NET: -300 mL      19 Jan 2023 07:01  -  19 Jan 2023 13:10  --------------------------------------------------------  IN:  Total IN: 0 mL    OUT:    Voided (mL): 275 mL  Total OUT: 275 mL    Total NET: -275 mL          LABS:                        12.6   4.66  )-----------( 392      ( 18 Jan 2023 11:32 )             38.4     01-18    140  |  103  |  9   ----------------------------<  104<H>  4.1   |  27  |  0.57    Ca    9.2      18 Jan 2023 11:32    TPro  7.5  /  Alb  4.4  /  TBili  0.5  /  DBili  x   /  AST  22  /  ALT  12  /  AlkPhos  85  01-18    PT/INR - ( 18 Jan 2023 11:32 )   PT: 12.2 sec;   INR: 1.02          PTT - ( 18 Jan 2023 11:32 )  PTT:33.9 sec      MEDICATIONS:    acetaminophen     Tablet .. 1000 milliGRAM(s) Oral every 8 hours  ondansetron    Tablet 4 milliGRAM(s) Oral every 8 hours PRN  oxyCODONE    IR 10 milliGRAM(s) Oral every 4 hours PRN  oxyCODONE    IR 5 milliGRAM(s) Oral every 4 hours PRN          ASSESSMENT AND PLAN: 26yo Female with left femoral neck fracture.     1. Analgesic pain control  2. DVT prophylaxis:     SCDs        3. Weight Bearing Status:        NWB  left le        4. Disposition: OR today, medically cleared. Will order TTW per Dr. Galeano due to RBBB, not needed for clearance for OR.   5. Anorexia hx- seen by nutrionist outpt, also called inhouse nutrionist consult no immediate recs, regular diet post op
INTERVAL EVENTS:  -- NAEO    SUBJECTIVE:  -- reports muscle cramps along the site of her incision   -- tolerating PO intake, voiding and passing flatus but no BM  -- has not gotten out of bed yet  -- Review of Systems: 12 point review of systems otherwise negative    MEDICATIONS:  MEDICATIONS  (STANDING):  acetaminophen     Tablet .. 1000 milliGRAM(s) Oral every 8 hours  acetaminophen     Tablet .. 650 milliGRAM(s) Oral every 6 hours  aspirin  chewable 81 milliGRAM(s) Oral two times a day  influenza   Vaccine 0.5 milliLiter(s) IntraMuscular once  lactated ringers. 1000 milliLiter(s) (100 mL/Hr) IV Continuous <Continuous>  multivitamin 1 Tablet(s) Oral daily  pantoprazole    Tablet 40 milliGRAM(s) Oral before breakfast  pantoprazole    Tablet 40 milliGRAM(s) Oral before breakfast  polyethylene glycol 3350 17 Gram(s) Oral daily  polyethylene glycol 3350 17 Gram(s) Oral at bedtime  senna 2 Tablet(s) Oral at bedtime    MEDICATIONS  (PRN):  cyclobenzaprine 5 milliGRAM(s) Oral three times a day PRN Muscle Spasm  HYDROmorphone  Injectable 0.5 milliGRAM(s) IV Push every 15 minutes PRN breakthrough  magnesium hydroxide Suspension 30 milliLiter(s) Oral daily PRN Constipation  ondansetron    Tablet 4 milliGRAM(s) Oral every 8 hours PRN Nausea and/or Vomiting  ondansetron Injectable 4 milliGRAM(s) IV Push every 6 hours PRN Nausea and/or Vomiting  oxyCODONE    IR 5 milliGRAM(s) Oral every 4 hours PRN Moderate Pain (4 - 6)  oxyCODONE    IR 10 milliGRAM(s) Oral every 4 hours PRN Severe Pain (7 - 10)  oxyCODONE    IR 10 milliGRAM(s) Oral every 4 hours PRN Severe Pain (7 - 10)  oxyCODONE    IR 5 milliGRAM(s) Oral every 4 hours PRN Moderate Pain (4 - 6)    Allergies  No Known Allergies    OBJECTIVE:  Vital Signs Last 24 Hrs  T(C): 36.7 (20 Jan 2023 08:16), Max: 36.9 (19 Jan 2023 22:00)  T(F): 98 (20 Jan 2023 08:16), Max: 98.4 (19 Jan 2023 22:00)  HR: 40 (20 Jan 2023 08:16) (34 - 94)  BP: 98/61 (20 Jan 2023 08:16) (98/61 - 140/76)  BP(mean): 91 (19 Jan 2023 21:00) (91 - 104)  RR: 16 (20 Jan 2023 08:16) (16 - 22)  SpO2: 100% (20 Jan 2023 08:16) (86% - 100%)    Parameters below as of 20 Jan 2023 08:16  Patient On (Oxygen Delivery Method): room air      I&O's Summary    19 Jan 2023 07:01  -  20 Jan 2023 07:00  --------------------------------------------------------  IN: 0 mL / OUT: 1225 mL / NET: -1225 mL    PHYSICAL EXAM:  Gen: NAD, lying comfortably in bed  HEENT: NCAT, MMM, clear OP  CV: RRR, no m/g/r appreciated  Pulm: CTA B, no w/r/r; no increase in WOB  Abd: normoactive BS, soft, NTND  Ext: WWP, 2+ pulses x4; no c/c/e; L-lateral thigh w/Aquacel c/d/i  Skin: bilateral hands w/skin cracking  Neuro: AOx3, CN II-XII grossly intact; nonfocal  Psych: pleasant, conversant and appropriate    LABS:                        11.6   8.90  )-----------( 324      ( 20 Jan 2023 08:22 )             36.4     01-20    135  |  96  |  11  ----------------------------<  102<H>  4.2   |  25  |  0.57    Ca    8.7      20 Jan 2023 08:22  Phos  5.1     01-20  Mg     1.7     01-20    TPro  7.5  /  Alb  4.4  /  TBili  0.5  /  DBili  x   /  AST  22  /  ALT  12  /  AlkPhos  85  01-18    PT/INR - ( 18 Jan 2023 11:32 )   PT: 12.2 sec;   INR: 1.02     PTT - ( 18 Jan 2023 11:32 )  PTT:33.9 sec    MICRODATA:  -- No new microdata.    RADIOLOGY/OTHER STUDIES:  -- No new imaging.
INTERVAL EVENTS:  -- NAEO  -- Repeat EKG with bradycardia but similar iRBBB    SUBJECTIVE:  -- again denies any history of heart disease; does have 2nd degree relatives with CAD and CHF (mostly later in life)   -- has never been told she has an abnormal EKG  -- reiterates that she has an unlimited ET w/o cardiac symptoms   -- Review of Systems: 12 point review of systems otherwise negative    MEDICATIONS:  MEDICATIONS  (STANDING):  acetaminophen     Tablet .. 1000 milliGRAM(s) Oral every 8 hours  influenza   Vaccine 0.5 milliLiter(s) IntraMuscular once  lactated ringers. 1000 milliLiter(s) (65 mL/Hr) IV Continuous <Continuous>  pantoprazole    Tablet 40 milliGRAM(s) Oral before breakfast  polyethylene glycol 3350 17 Gram(s) Oral daily  povidone iodine 5% Nasal Swab 1 Application(s) Both Nostrils once    MEDICATIONS  (PRN):  ondansetron    Tablet 4 milliGRAM(s) Oral every 8 hours PRN Nausea and/or Vomiting  oxyCODONE    IR 10 milliGRAM(s) Oral every 4 hours PRN Severe Pain (7 - 10)  oxyCODONE    IR 5 milliGRAM(s) Oral every 4 hours PRN Moderate Pain (4 - 6)    Allergies  No Known Allergies    OBJECTIVE:  Vital Signs Last 24 Hrs  T(C): 36.3 (19 Jan 2023 08:12), Max: 36.8 (18 Jan 2023 15:00)  T(F): 97.4 (19 Jan 2023 08:12), Max: 98.3 (18 Jan 2023 18:37)  HR: 34 (19 Jan 2023 08:12) (34 - 52)  BP: 108/65 (19 Jan 2023 08:12) (101/64 - 136/73)  BP(mean): --  RR: 18 (19 Jan 2023 08:12) (16 - 18)  SpO2: 99% (19 Jan 2023 08:12) (98% - 100%)    Parameters below as of 19 Jan 2023 08:12  Patient On (Oxygen Delivery Method): room air    I&O's Summary    18 Jan 2023 07:01  -  19 Jan 2023 07:00  --------------------------------------------------------  IN: 0 mL / OUT: 300 mL / NET: -300 mL    19 Jan 2023 07:01  -  19 Jan 2023 11:12  --------------------------------------------------------  IN: 0 mL / OUT: 275 mL / NET: -275 mL    PHYSICAL EXAM:  Gen: NAD, lying comfortably in bed  HEENT: NCAT, MMM, clear OP  CV: RRR, no m/g/r appreciated  Pulm: CTA B, no w/r/r; no increase in WOB  Abd: normoactive BS, soft, NTND  Ext: WWP, 2+ pulses x4; no c/c/e  Skin: bilateral hands w/skin cracking  Neuro: AOx3, CN II-XII grossly intact; nonfocal  Psych: pleasant, conversant and appropriate    LABS:  -- No new labs.      MICRODATA:  -- No new microdata.    RADIOLOGY/OTHER STUDIES:  -- CXR (1/18) - clear lungs 
Ortho Post Op Check    Procedure: L [facility]   Surgeon: Lee    Pt complaining of intermittent muscle spasms.  Denies CP, SOB, N/V, numbness/tingling     Vital Signs Last 24 Hrs  T(C): 36.8 (01-19-23 @ 19:03), Max: 36.8 (01-19-23 @ 19:03)  T(F): 98.3 (01-19-23 @ 19:03), Max: 98.3 (01-19-23 @ 19:03)  HR: 42 (01-19-23 @ 21:00) (36 - 57)  BP: 124/65 (01-19-23 @ 21:00) (124/65 - 140/76)  BP(mean): 91 (01-19-23 @ 21:00) (91 - 104)  RR: 18 (01-19-23 @ 21:00) (18 - 22)  SpO2: 99% (01-19-23 @ 21:00) (86% - 100%)  I&O's Summary    18 Jan 2023 07:01  -  19 Jan 2023 07:00  --------------------------------------------------------  IN: 0 mL / OUT: 300 mL / NET: -300 mL    19 Jan 2023 07:01  -  19 Jan 2023 23:16  --------------------------------------------------------  IN: 0 mL / OUT: 275 mL / NET: -275 mL        Physical Exam:  General: Pt Alert and oriented, NAD  L hip with aquacell DSG C/D/I  Pulses: 2+ dp, pt pulses, toes wwp, cap refill <3 sec  Sensation: SILT in sural/saph/sp/dp/tibial distributions b/l LE  Motor: EHL/FHL/TA/GS  firing equally b/l LE                          12.6   4.66  )-----------( 392      ( 18 Jan 2023 11:32 )             38.4     01-18    140  |  103  |  9   ----------------------------<  104<H>  4.1   |  27  |  0.57    Ca    9.2      18 Jan 2023 11:32    TPro  7.5  /  Alb  4.4  /  TBili  0.5  /  DBili  x   /  AST  22  /  ALT  12  /  AlkPhos  85  01-18      Post-op X-Ray:  Xray L Hip: Hardware in place maintaining reduction of fracture    A/P: 27yFemale POD#0 s/p L hip DHS on 1/19  - Stable  - Pain Control  - f/u AM labs  - DVT ppx: ASA  - Post op abx: periop ancef  - PT, WBS: PWB  - Dispo: pending PT    Jason Díaz, PGY-2  Ortho Pager [phone]
Procedure: L DHS   Surgeon: Lee    Pt complaining of intermittent muscle spasms at mid thigh (states that it feels like a carmen horse)  Denies CP, SOB, N/V, numbness/tingling       Vital Signs Last 24 Hrs  T(C): 36.7 (20 Jan 2023 08:16), Max: 36.9 (19 Jan 2023 22:00)  T(F): 98 (20 Jan 2023 08:16), Max: 98.4 (19 Jan 2023 22:00)  HR: 40 (20 Jan 2023 08:16) (34 - 94)  BP: 98/61 (20 Jan 2023 08:16) (98/61 - 140/76)  BP(mean): 91 (19 Jan 2023 21:00) (91 - 104)  RR: 16 (20 Jan 2023 08:16) (16 - 22)  SpO2: 100% (20 Jan 2023 08:16) (86% - 100%)    Parameters below as of 20 Jan 2023 08:16  Patient On (Oxygen Delivery Method): room air    Physical Exam:  General: Pt Alert and oriented, NAD  L hip with aquacell DSG C/D/I  Pulses: 2+ dp, pt pulses, toes wwp, cap refill <3 sec  Sensation: SILT in sural/saph/sp/dp/tibial distributions b/l LE  Motor: EHL/FHL/TA/GS  firing equally b/l LE      A/P: 27yFemaleb s/p L hip DHS on 1/19  - Stable  - Pain Control  - f/u AM labs  - DVT ppx: ASA  - Post op abx: periop ancef  - PT, WBS: PWB  - Dispo: pending PT    
POST OPERATIVE DAY #: 1  STATUS POST: Left hip DHS                      SUBJECTIVE: Patient seen and examined. Pt. states she is doing well, had some muscle spasms, but medication worked to relieve them.   Denies any sob/cp/n/v/numbness or tingling in b/l les.     OBJECTIVE:     Vital Signs Last 24 Hrs  T(C): 36.7 (20 Jan 2023 08:16), Max: 36.9 (19 Jan 2023 22:00)  T(F): 98 (20 Jan 2023 08:16), Max: 98.4 (19 Jan 2023 22:00)  HR: 96 (20 Jan 2023 13:45) (36 - 96)  BP: 102/58 (20 Jan 2023 13:45) (93/60 - 140/76)  BP(mean): 91 (19 Jan 2023 21:00) (91 - 104)  RR: 16 (20 Jan 2023 08:16) (16 - 22)  SpO2: 100% (20 Jan 2023 13:00) (86% - 100%)    Parameters below as of 20 Jan 2023 08:16  Patient On (Oxygen Delivery Method): room air        General: NAD  Affected extremity: LEFT LE   Dressing: clean/dry/intact  wrapped with ace   Sensation: intact to light touch to patient's baseline  Motor exam: EHL/TA/GS   5/5  Pulses intact              I&O's Detail    19 Jan 2023 07:01  -  20 Jan 2023 07:00  --------------------------------------------------------  IN:  Total IN: 0 mL    OUT:    Voided (mL): 1225 mL  Total OUT: 1225 mL    Total NET: -1225 mL          LABS:                        11.6   8.90  )-----------( 324      ( 20 Jan 2023 08:22 )             36.4     01-20    135  |  96  |  11  ----------------------------<  102<H>  4.2   |  25  |  0.57    Ca    8.7      20 Jan 2023 08:22  Phos  5.1     01-20  Mg     1.7     01-20            MEDICATIONS:    acetaminophen     Tablet .. 1000 milliGRAM(s) Oral every 8 hours  cyclobenzaprine 5 milliGRAM(s) Oral three times a day PRN  HYDROmorphone  Injectable 0.5 milliGRAM(s) IV Push every 15 minutes PRN  ondansetron    Tablet 4 milliGRAM(s) Oral every 8 hours PRN  ondansetron Injectable 4 milliGRAM(s) IV Push every 6 hours PRN  oxyCODONE    IR 5 milliGRAM(s) Oral every 4 hours PRN  oxyCODONE    IR 10 milliGRAM(s) Oral every 4 hours PRN  oxyCODONE    IR 10 milliGRAM(s) Oral every 4 hours PRN  oxyCODONE    IR 5 milliGRAM(s) Oral every 4 hours PRN    aspirin  chewable 81 milliGRAM(s) Oral two times a day        ASSESSMENT AND PLAN: 28yo Female s/p left hip dhs    1. Analgesic pain control  2. DVT prophylaxis: ASA       SCDs       3. Weight Bearing Status:     Partial Weight Bearing left le   4. Disposition: Home today.   5. Appreciate medicine recs - -- please start vitamin D3 50,000 IU PO weekly x 6wks and refer to Endocrine/Bone Specialist   -- will need DEXA scan as an outpatient

## 2023-01-20 NOTE — DISCHARGE NOTE NURSING/CASE MANAGEMENT/SOCIAL WORK - PATIENT PORTAL LINK FT
You can access the FollowMyHealth Patient Portal offered by [facility] by registering at the following website: [email or web address]. By joining [facility]s FollowMyHealth portal, you will also be able to view your health information using other applications (apps) compatible with our system.

## 2023-01-20 NOTE — DISCHARGE NOTE PROVIDER - CARE PROVIDER_API CALL
Joselo Howard)  Orthopaedic Surgery  [address] [place] [address]  Phone: [phone]  Fax: [phone]  Follow Up Time:

## 2023-01-20 NOTE — DISCHARGE NOTE PROVIDER - DID THE PATIENT PRESENT WITH OR WAS TREATED FOR MALNUTRITION DURING THIS ADMISSION
Received a phone message from a deeper voiced caller using patients contact number. Caller states had to call the  on the identified patient due to being \"mentally fucked up\". \"You'll probably be seeing her and the \" Caller hung up.  
No

## 2023-01-20 NOTE — DISCHARGE NOTE PROVIDER - NSDCMRMEDTOKEN_GEN_ALL_CORE_FT
acetaminophen 500 mg oral tablet: 2 tab(s) orally every 8 hours  aspirin 81 mg oral tablet, chewable: 1 tab(s) orally 2 times a day MDD:2  cyclobenzaprine 5 mg oral tablet: 1 tab(s) orally 3 times a day, As needed, Muscle Spasm MDD:3  pantoprazole 40 mg oral delayed release tablet: 1 tab(s) orally once a day (before a meal) MDD:1  senna leaf extract oral tablet: 2 tab(s) orally once a day (at bedtime)  Vitamin D2 1.25 mg (50,000 intl units) oral capsule: 1 cap(s) orally once a week   acetaminophen 500 mg oral tablet: 2 tab(s) orally every 8 hours  aspirin 81 mg oral tablet, chewable: 1 tab(s) orally 2 times a day MDD:2  cyclobenzaprine 5 mg oral tablet: 1 tab(s) orally 3 times a day, As needed, Muscle Spasm MDD:3  oxyCODONE 5 mg oral tablet: 1-2  tab(s) orally every 4 to 6 hours, As Needed -Moderate to severe Pain  MDD:6   pantoprazole 40 mg oral delayed release tablet: 1 tab(s) orally once a day (before a meal) MDD:1  senna leaf extract oral tablet: 2 tab(s) orally once a day (at bedtime)  Vitamin D2 1.25 mg (50,000 intl units) oral capsule: 1 cap(s) orally once a week

## 2023-01-20 NOTE — PROGRESS NOTE ADULT - ASSESSMENT
This is a 28yo woman with a PMH of Juvenile arthritis (now resolved) and well-controlled anorexia nervosa who p/w 2 weeks of left hip, found to have a L-femoral neck fracture, now s/p ORIF on 1/19.  Clinically doing well    #Post-op exam  -- defer pain mgmt and wound care to primary team  -- would bear in mind that she does not have insurance when preparing her scripts for discharge   -- pending PT eval today     #Pre-op exam  -- Risk of planned surgical procedure is intermediate.  -- Patient does not have high risk cardiac conditions (i.e. unstable angina, decompensated CHF, significant arrhythmia, significant valvular disease)   -- RCRI score 0, Pimentel score 0   -- Patient meet METs >4 blocks and >2 flights of stairs.  -- Patient is low cardiac risk for intermediate risk procedure. Can proceed w/surgery if accepting of these risks.    #Anorexia nervosa  -- can check vitamin D, TSH and PTH  -- will need DEXA scan as an outpatient     #Acute blood loss anemia  -- expected post-op change; Hb 12.6  to 11.6    #Diet - Regular  #DVT PPx - defer to primary team; on ASA 81mg BID   #Dispo - TBD    Renetta Galeano  Attending Hospitalist  [phone] This is a 26yo woman with a PMH of Juvenile arthritis (now resolved) and well-controlled anorexia nervosa who p/w 2 weeks of left hip, found to have a L-femoral neck fracture, now s/p ORIF on 1/19.  Clinically doing well    #Post-op exam  -- defer pain mgmt and wound care to primary team  -- would bear in mind that she does not have insurance when preparing her scripts for discharge   -- pending PT eval today     #Pre-op exam  -- Risk of planned surgical procedure is intermediate.  -- Patient does not have high risk cardiac conditions (i.e. unstable angina, decompensated CHF, significant arrhythmia, significant valvular disease)   -- RCRI score 0, Pimentel score 0   -- Patient meet METs >4 blocks and >2 flights of stairs.  -- Patient is low cardiac risk for intermediate risk procedure. Can proceed w/surgery if accepting of these risks.    #Anorexia nervosa  -- can check TSH and PTH    #Vitamin D Deficiency   -- please start vitamin D3 50,000 IU PO weekly x 6wks and refer to Endocrine/Bone Specialist   -- will need DEXA scan as an outpatient     #Acute blood loss anemia  -- expected post-op change; Hb 12.6  to 11.6    #Diet - Regular  #DVT PPx - defer to primary team; on ASA 81mg BID   #Dispo - TBD    Renetta Galeano  Attending Hospitalist  [phone]

## 2023-01-20 NOTE — PHYSICAL THERAPY INITIAL EVALUATION ADULT - ADDITIONAL COMMENTS
Patient lives with roommates on 2nd floor walk up apartment. Does not use any Assistive Devices at baseline. Have been using axillary crutches since fracture. Denies recent Hx of falls.

## 2023-01-23 PROBLEM — Z87.39 PERSONAL HISTORY OF OTHER DISEASES OF THE MUSCULOSKELETAL SYSTEM AND CONNECTIVE TISSUE: Chronic | Status: ACTIVE | Noted: 2023-01-18

## 2023-01-24 DIAGNOSIS — F50.00 ANOREXIA NERVOSA, UNSPECIFIED: ICD-10-CM
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
## 2023-01-24 DIAGNOSIS — E55.9 VITAMIN D DEFICIENCY, UNSPECIFIED: ICD-10-CM

## 2023-01-24 DIAGNOSIS — M84.359A STRESS FRACTURE, HIP, UNSPECIFIED, INITIAL ENCOUNTER FOR FRACTURE: ICD-10-CM
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
## 2023-01-24 DIAGNOSIS — D62 ACUTE POSTHEMORRHAGIC ANEMIA: ICD-10-CM

## 2023-01-30 ENCOUNTER — APPOINTMENT (OUTPATIENT)
Dept: ORTHOPEDIC SURGERY | Facility: CLINIC | Age: 28
End: 2023-01-30
Payer: COMMERCIAL

## 2023-01-30 VITALS — WEIGHT: 140 LBS | RESPIRATION RATE: 16 BRPM | BODY MASS INDEX: 21.22 KG/M2 | HEIGHT: 68 IN

## 2023-01-30 PROCEDURE — 99024 POSTOP FOLLOW-UP VISIT: CPT

## 2023-02-27 ENCOUNTER — APPOINTMENT (OUTPATIENT)
Dept: ORTHOPEDIC SURGERY | Facility: CLINIC | Age: 28
End: 2023-02-27
Payer: COMMERCIAL

## 2023-02-27 VITALS — BODY MASS INDEX: 21.22 KG/M2 | HEIGHT: 68 IN | RESPIRATION RATE: 16 BRPM | WEIGHT: 140 LBS

## 2023-02-27 DIAGNOSIS — Z78.9 OTHER SPECIFIED HEALTH STATUS: ICD-10-CM

## 2023-02-27 PROCEDURE — 99024 POSTOP FOLLOW-UP VISIT: CPT

## 2023-02-27 PROCEDURE — 73502 X-RAY EXAM HIP UNI 2-3 VIEWS: CPT | Mod: LT

## 2023-03-27 ENCOUNTER — APPOINTMENT (OUTPATIENT)
Dept: ORTHOPEDIC SURGERY | Facility: CLINIC | Age: 28
End: 2023-03-27
Payer: COMMERCIAL

## 2023-03-27 VITALS — RESPIRATION RATE: 16 BRPM | WEIGHT: 140 LBS | BODY MASS INDEX: 21.22 KG/M2 | HEIGHT: 68 IN

## 2023-03-27 DIAGNOSIS — S72.002A FRACTURE OF UNSPECIFIED PART OF NECK OF LEFT FEMUR, INITIAL ENCOUNTER FOR CLOSED FRACTURE: ICD-10-CM

## 2023-03-27 PROCEDURE — 73502 X-RAY EXAM HIP UNI 2-3 VIEWS: CPT | Mod: LT

## 2023-03-27 PROCEDURE — 99024 POSTOP FOLLOW-UP VISIT: CPT

## 2023-07-16 DIAGNOSIS — F50.9 EATING DISORDER, UNSPECIFIED: ICD-10-CM

## 2025-07-29 NOTE — PRE-OP CHECKLIST - VERIFY SURGICAL SITE/SIDE WITH PATIENT
Physical Therapy: No show/Cancellation of Visit  Date: 07/29/2025    Patient was a cancel to today's PT appointment. Reason for cancellation: appointment time no longer works via phone call. Patient's next scheduled appointment is 8/7/2025.    Cancel: 1   No show: 0     Therapist: Elsie Nelson, PT, DPT, OCS           done

## (undated) DEVICE — Device

## (undated) DEVICE — GLV 8 PROTEXIS (WHITE)

## (undated) DEVICE — MARKING PEN W RULER

## (undated) DEVICE — WARMING BLANKET UPPER ADULT

## (undated) DEVICE — DRILL BIT STRYKER CANN 4.9X240MM

## (undated) DEVICE — DRAPE C ARM 41X74"

## (undated) DEVICE — DRAPE SHOWER CURTAIN ISOLATION

## (undated) DEVICE — VENODYNE/SCD SLEEVE CALF MEDIUM

## (undated) DEVICE — PACK BASIC GOWN MAYO COVER